# Patient Record
Sex: FEMALE | Race: WHITE
[De-identification: names, ages, dates, MRNs, and addresses within clinical notes are randomized per-mention and may not be internally consistent; named-entity substitution may affect disease eponyms.]

---

## 2017-03-26 ENCOUNTER — HOSPITAL ENCOUNTER (EMERGENCY)
Dept: HOSPITAL 42 - ED | Age: 42
Discharge: HOME | End: 2017-03-26
Payer: MEDICAID

## 2017-03-26 VITALS
DIASTOLIC BLOOD PRESSURE: 69 MMHG | RESPIRATION RATE: 17 BRPM | OXYGEN SATURATION: 96 % | TEMPERATURE: 97.7 F | SYSTOLIC BLOOD PRESSURE: 117 MMHG | HEART RATE: 82 BPM

## 2017-03-26 VITALS — BODY MASS INDEX: 26.6 KG/M2

## 2017-03-26 DIAGNOSIS — M25.561: Primary | ICD-10-CM

## 2017-03-26 DIAGNOSIS — I10: ICD-10-CM

## 2017-03-26 DIAGNOSIS — M06.9: ICD-10-CM

## 2017-03-26 PROCEDURE — 96372 THER/PROPH/DIAG INJ SC/IM: CPT

## 2017-03-26 PROCEDURE — 73560 X-RAY EXAM OF KNEE 1 OR 2: CPT

## 2017-03-26 PROCEDURE — 84703 CHORIONIC GONADOTROPIN ASSAY: CPT

## 2017-03-26 PROCEDURE — 99283 EMERGENCY DEPT VISIT LOW MDM: CPT

## 2017-03-26 PROCEDURE — 93970 EXTREMITY STUDY: CPT

## 2017-03-26 NOTE — ED PDOC
Arrival/HPI





<Misael Ivan - Last Filed: 03/26/17 06:31>





- General


Historian: Patient





- History of Present Illness


Time/Duration: Prior to Arrival


Symptom Course: Intermittent


Quality: Stabbing





<Romeo Ball - Last Filed: 03/26/17 06:35>





- General


Chief Complaint: Lower Extremity Problem/Injury


Time Seen by Provider: 03/26/17 04:32





- History of Present Illness


Narrative History of Present Illness (Text): 





03/26/17 04:55


42 y/o female with hx rheumatoid arthritis, diabetes, hypothyroidism presenting 

with complaints of right posterior knee pain. Patient states she has been 

experiencing intermittent pain for the last 10 days however her pain has 

significantly worsened tonight. Her pain typically presents at nighttime, often 

waking her from sleep. She denies any injury to the leg or knee joint. She 

denies any history of DVT, leg swelling or tenderness. No recent hx of 

immobility, OCP use or smoking. Pain is worse with flexion of right knee. 

Patient denies shortness of breath, chest pain, fever, chills.  (Romeo Ball)








Past Medical History





- Provider Review


Nursing Documentation Reviewed: Yes





- Infectious Disease


Hx of Infectious Diseases: None





- Tetanus Immunization


Tetanus Immunization: Unknown





- Cardiac


Hx Hypertension: Yes





- Pulmonary


Hx Respiratory Disorders: Yes


Hx Asthma: Yes





- Endocrine/Metabolic


Hx Diabetes Mellitus Type 1: Yes


Hx Hypothyroidism: Yes





- Psychiatric


Hx Depression: No


Hx Emotional Abuse: No


Hx Physical Abuse: No


Hx Substance Use: No





- Surgical History


Hx Cholecystectomy: Yes


Hx Hysterectomy: Yes


Hx Tubal Ligation: Yes (??)





- Suicidal Assessment


Feels Threatened In Home Enviroment: No





<Romeo Ball - Last Filed: 03/26/17 06:35>





Family/Social History





- Physician Review


Nursing Documentation Reviewed: Yes


Family/Social History: Unknown Family HX


Smoking Status: Never Smoked


Hx Alcohol Use: No


Hx Substance Use: No


Hx Substance Use Treatment: No





<Romeo Ball - Last Filed: 03/26/17 06:35>





Allergies/Home Meds





<Misael Ivan - Last Filed: 03/26/17 06:31>





<Romeo Ball - Last Filed: 03/26/17 06:35>


Allergies/Adverse Reactions: 


Allergies





No Known Allergies Allergy (Verified 02/05/16 23:00)


 


 per patient 








Home Medications: 


 Home Meds











 Medication  Instructions  Recorded  Confirmed


 


Glimepiride 2 mg PO BID 10/26/12 03/26/17


 


Metformin Hydrochloride [Metformin] 500 mg PO DAILY 09/26/13 03/26/17


 


Levothyroxine Sodium 75 mcg PO DAILY 02/23/15 03/26/17


 


Amlodipine Besylate/Benazepril 1 cap PO DAILY 03/26/17 03/26/17





[Amlodipine Besylate and   





Benazepril Hydrochlor]   














Review of Systems





- Physician Review


All systems were reviewed & negative as marked: Yes





- Review of Systems


Respiratory: absent: SOB, Cough


Cardiovascular: absent: Chest Pain


Musculoskeletal: Other (+ right posterior knee pain )





<Romeo Ball - Last Filed: 03/26/17 06:35>





Physical Exam


Vital Signs Reviewed: Yes


Temperature: Afebrile


Blood Pressure: Normal


Pulse: Regular


Respiratory Rate: Normal


Appearance: Positive for: Well-Appearing


Pain Distress: Mild


Mental Status: Positive for: Alert and Oriented X 3





- Systems Exam


Head: Present: Atraumatic, Normocephalic


Pupils: Present: PERRL


Extroacular Muscles: Present: EOMI


Conjunctiva: Present: Normal


Neck: Present: Normal Range of Motion


Respiratory/Chest: Present: Clear to Auscultation.  No: Respiratory Distress, 

Accessory Muscle Use


Cardiovascular: Present: Regular Rate and Rhythm, Normal S1, S2


Abdomen: Present: Normal Bowel Sounds.  No: Tenderness, Distention


Back: Present: Normal Inspection


Upper Extremity: Present: Normal Inspection.  No: Cyanosis, Edema


Lower Extremity: Present: Normal Inspection, NORMAL PULSES, Neurovascularly 

Intact.  No: Edema, CALF TENDERNESS, Cyanosis, Normal ROM (decreased knee 

flexion 2/2 pain ), Eugene's Sign, Tenderness, Swelling, Erythema


Neurological: Present: GCS=15, CN II-XII Intact, Speech Normal


Skin: Present: Warm, Dry, Normal Color.  No: Rashes


Psychiatric: Present: Alert, Oriented x 3, Normal Insight, Normal Concentration





<Romeo Ball - Last Filed: 03/26/17 06:35>


Vital Signs











  Temp Pulse Resp BP Pulse Ox


 


 03/26/17 04:41  97.7 F  82  17  117/69  96

















Medical Decision Making





<Misael Ivan - Last Filed: 03/26/17 06:31>





<Romeo Ball - Last Filed: 03/26/17 06:35>


ED Course and Treatment: 


Impression:


Pt seen and evaluated with medical resident. Pt, whose past medical history 

includes  rheumatoid arthritis, diabetes, and hypothyroidism, presented for 

intermittent posterior right knee pain for 10 days, worsening tonight. Aware 

and agree with HPI, clinical findings, plan, and management.





Plan:


-- XR Right Knee


-- US Duplex Lower Extremities


-- Reassess and disposition


 (Misael Ivan)





03/26/17 05:01


42 y/o female with hx rheumatoid arthritis, DM2, hypothyroidism presenting with 

right posterior knee pain. No injury or trauma reported. Patient with no 

significant risk factors for DVT. 


- knee xray 


- doppler b/l LE 


- reassess (Romeo Ball)








- Lab Interpretations


Lab Results: 


 Lab Results





03/26/17 05:02: Urine HCG, Qual Negative














- RAD Interpretation


Radiology Orders: 








03/26/17 04:53


DUPLEX LOWER EXTRM VEIN BILAT [US] Stat 





03/26/17 05:02


KNEE RIGHT 2 VIEWS (AP & LAT) [RAD] Stat 

















- PA / NP / Resident Statement


MD/ has reviewed & agrees with the documentation as recorded.


MD/ has examined the patient and agrees with the treatment plan.





<Misael Ivan - Last Filed: 03/26/17 06:31>





Disposition/Present on Arrival





<Misael Ivan - Last Filed: 03/26/17 06:31>





- Present on Arrival


Any Indicators Present on Arrival: No


History of DVT/PE: No


History of Uncontrolled Diabetes: No


Urinary Catheter: No


History of Decub. Ulcer: No


History Surgical Site Infection Following: None





- Disposition


Have Diagnosis and Disposition been Completed?: Yes


Disposition Time: 06:26


Patient Plan: Discharge





<Romeo Ball - Last Filed: 03/26/17 06:35>





- Disposition


Diagnosis: 


 Knee pain, chronic


Disposition: HOME/ ROUTINE


Patient Problems: 


 Current Active Problems











Problem Status Diagnosed


 


Knee pain, chronic Acute 











Condition: GOOD


Discharge Instructions (ExitCare):  Knee Pain (ED), Knee Exercises (GEN)


Additional Instructions: 


Please follow up with an Orthopedist for treatment of your knee pain. 


If pain continues to worsen or changes return to the emergency room. 


There is no evidence of bone fracture or blood clots to either leg. 


Referrals: 


Kaitlynn Caisllas MD [Primary Care Provider] - Follow up with primary


Marty Hay MD [Staff Provider] - Follow up with primary

## 2017-03-26 NOTE — US
HISTORY:

Leg pain and swelling. Evaluate for DVT



PHYSICIAN(S):  Gabino Bhatia MD.



TECHNIQUE:

Duplex sonography and color-flow Doppler with graded compression were 

used to evaluate the deep venous systems of both lower extremities. 



FINDINGS:

The visualized deep venous systems of both lower extremities are 

sonographically normal and compressible. Normal wave forms and 

augmentation are seen. There is no sonographic evidence for deep 

venous thrombosis in the visualized segments of both lower 

extremities.



IMPRESSION:

No sonographic evidence for deep venous thrombosis in the visualized 

segments of both lower extremities.

## 2017-03-26 NOTE — RAD
PROCEDURE:  Right Knee Radiographs.



HISTORY:

knee pain



COMPARISON:

None.



FINDINGS:



BONES:

Bone alignment and mineralization are normal.  There is no acute 

fracture or bone destruction. 



JOINTS:

There is moderate tricompartmental degenerative osteoarthrosis with 

reduced joint spaces, marginal osteophytes and tibial spiking, worse 

in the medial compartment. 



JOINT EFFUSION:

None. 



OTHER FINDINGS:

None.



IMPRESSION:

Moderate tricompartmental degenerative osteoarthrosis, worse in the 

medial compartment.

## 2017-08-17 ENCOUNTER — HOSPITAL ENCOUNTER (EMERGENCY)
Dept: HOSPITAL 42 - ED | Age: 42
Discharge: HOME | End: 2017-08-17
Payer: MEDICAID

## 2017-08-17 VITALS
RESPIRATION RATE: 18 BRPM | TEMPERATURE: 98.3 F | OXYGEN SATURATION: 97 % | SYSTOLIC BLOOD PRESSURE: 125 MMHG | HEART RATE: 89 BPM | DIASTOLIC BLOOD PRESSURE: 66 MMHG

## 2017-08-17 VITALS — BODY MASS INDEX: 26.6 KG/M2

## 2017-08-17 DIAGNOSIS — M25.571: Primary | ICD-10-CM

## 2017-08-17 NOTE — RAD
PROCEDURE:  Radiographs of the right tibia and fibula. 



HISTORY:

pain



COMPARISON:

None available.



TECHNIQUE:

Frontal and lateral views obtained. 



FINDINGS:



BONES:

No fracture or destructive lesion.



JOINT SPACES:

Unremarkable.



OTHER FINDINGS:

None.



IMPRESSION:

Unremarkable radiographs of the right tibia and fibula.

## 2017-08-17 NOTE — RAD
PROCEDURE:  Right Ankle Radiographs.



HISTORY:

pain  s/p trauma  



COMPARISON:

None



FINDINGS:



BONES:

Normal. No fracture. 



JOINTS:

Normal. No osteoarthritis. Ankle mortise maintained. Talar dome intact



SOFT TISSUES:

Normal. 



OTHER FINDINGS:

None.



IMPRESSION:

Normal right ankle radiographs.

## 2017-08-17 NOTE — ED PDOC
Arrival/HPI





- General


Chief Complaint: Lower Extremity Problem/Injury


Time Seen by Provider: 08/17/17 01:05


Historian: Patient





- History of Present Illness


Narrative History of Present Illness (Text): 





08/17/17 01:16


This 40 yo female presents to this ED c/o right ankle pain x 5 hours.  Patient 

stated she twisted her right ankle at a local gym.  Patient stated ankle pain 

was mild, so she told gym staff that she did not need to got to ED.  Patient 

went home, the pain has worsen few hours later.  Patient is pointing at medial 

malleoulus area as the most painful side.  Denies posterior ankle pain, knee 

pain, calf pain, weakness, paresthesias, or hip pain.  Denies other complains.


Time/Duration: 4-6 hours


Symptom Onset: Sudden


Symptom Course: Worsening


Quality: Aching


Context: Other (gym)





Past Medical History





- Provider Review


Nursing Documentation Reviewed: Yes





- Infectious Disease


Hx of Infectious Diseases: None





- Tetanus Immunization


Tetanus Immunization: Unknown





- Cardiac


Hx Hypertension: Yes





- Pulmonary


Hx Respiratory Disorders: Yes


Hx Asthma: Yes





- Neurological


Hx Neurological Disorder: No





- HEENT


Hx HEENT Disorder: No





- Renal


Hx Kidney Stones: No





- Endocrine/Metabolic


Hx Diabetes Mellitus Type 1: Yes


Hx Hypothyroidism: Yes





- Hematological/Oncological


Hx Blood Disorders: No





- Integumentary


Hx Dermatological Disorder: No





- Musculoskeletal/Rheumatological


Hx Musculoskeletal Disorders: No





- Gastrointestinal


Hx Gastrointestinal Disorders: No





- Genitourinary/Gynecological


Hx Genitourinary Disorders: No





- Psychiatric


Hx Psychophysiologic Disorder: No


Hx Depression: No


Hx Emotional Abuse: No


Hx Physical Abuse: No


Hx Substance Use: No





- Surgical History


Hx Cholecystectomy: Yes


Hx Hysterectomy: Yes


Hx Tubal Ligation: Yes (??)





- Suicidal Assessment


Feels Threatened In Home Enviroment: No





Family/Social History





- Physician Review


Nursing Documentation Reviewed: Yes


Family/Social History: Other (non-contributory)


Smoking Status: Never Smoked


Hx Alcohol Use: No


Hx Substance Use: No


Hx Substance Use Treatment: No





Allergies/Home Meds


Allergies/Adverse Reactions: 


Allergies





No Known Allergies Allergy (Verified 08/17/17 01:01)


 


 per patient 








Home Medications: 


 Home Meds











 Medication  Instructions  Recorded  Confirmed


 


Glimepiride 2 mg PO BID 10/26/12 03/26/17


 


Metformin Hydrochloride [Metformin] 500 mg PO DAILY 09/26/13 03/26/17


 


Levothyroxine Sodium 75 mcg PO DAILY 02/23/15 03/26/17


 


Amlodipine Besylate/Benazepril 1 cap PO DAILY 03/26/17 03/26/17





[Amlodipine Besylate and   





Benazepril Hydrochlor]   














Review of Systems





- Review of Systems


Constitutional: Normal.  absent: Fatigue, Weight Change, Fevers


Eyes: Normal


ENT: Normal


Respiratory: Normal.  absent: SOB, Cough


Cardiovascular: Normal.  absent: Chest Pain, Other


Gastrointestinal: Normal.  absent: Abdominal Pain, Nausea, Vomiting


Genitourinary Female: Normal


Musculoskeletal: Other (see hpi)


Skin: Normal


Neurological: Normal.  absent: Headache, Dizziness, Focal Weakness


Endocrine: Normal


Hemo/Lymphatic: Normal


Psychiatric: Normal





Physical Exam


Vital Signs











  Temp Pulse Resp BP Pulse Ox


 


 08/17/17 01:02  98.3 F  89  18  125/66  97











Temperature: Afebrile


Blood Pressure: Normal


Pulse: Regular


Respiratory Rate: Normal


Appearance: Positive for: Well-Appearing, Non-Toxic, Comfortable


Pain Distress: None


Mental Status: Positive for: Alert and Oriented X 3





- Systems Exam


Head: Present: Atraumatic, Normocephalic


Pupils: Present: PERRL


Extroacular Muscles: Present: EOMI


Conjunctiva: Present: Normal


Mouth: Present: Moist Mucous Membranes


Upper Extremity: Present: Normal Inspection, Normal ROM


Lower Extremity: Present: NORMAL PULSES, Tenderness, Swelling, Neurovascularly 

Intact, Capillary Refill < 2 s, Other ((+) mild swelling and tender on right 

lateral malleoulus area.  Edmondson test was negative.  No calf tenderness.   No 

posterior ankle tenderness).  No: Edema, CALF TENDERNESS, Normal ROM, Eugene's 

Sign, Erythema, Deformity, Temperature Abnormalties


Neurological: Present: GCS=15, CN II-XII Intact, Speech Normal, Motor Func 

Grossly Intact, Normal Sensory Function, Normal Cerebellar Funct, Memory Normal


Skin: Present: Warm, Dry, Normal Color.  No: Rashes


Psychiatric: Present: Alert, Oriented x 3





Medical Decision Making


ED Course and Treatment: 





08/17/17 02:06


Re-evaluation.  Patient feels better.  Discussed results and plan with patient 

who expresses understanding.  All questions answered and there is agreement 

with the plan to discharge home with instructions.  Patient stable for 

discharge.  Return if symptoms persist or worsen


Patient was recommended to keep ankle elevated, ice, ace bandage, air cast, 

crutches for at least 5 days.  To f/u podiatrist if pain persist.  To remove 

ace bandage and air cast at bedtime.  Return to emergency if symptoms worsen.


Re-evaluation Time: 02:06


Reassessment Condition: Re-examined, Improved





- RAD Interpretation


Narrative RAD Interpretations (Text): 





08/17/17 02:06


Ankle x-rays:  No fx





Tib Fib x-rays:  no fx











Radiology Orders: 








08/17/17 01:15


ANKLE RIGHT 3 VIEWS ROUTINE [RAD] Stat 


TIBIA FIBULA RIGHT [RAD] Stat 














- Medication Orders


Current Medication Orders: 











Discontinued Medications





Acetaminophen (Tylenol 325mg Tab)  650 mg PO STAT STA


   Stop: 08/17/17 01:17


   Last Admin: 08/17/17 01:31  Dose: 650 mg





Acetaminophen (Tylenol 325mg Tab) Confirm Administered Dose 650 mg .ROUTE .STK-

MED ONE


   Stop: 08/17/17 01:28











Disposition/Present on Arrival





- Present on Arrival


Any Indicators Present on Arrival: No


History of DVT/PE: No


History of Uncontrolled Diabetes: No


Urinary Catheter: No


History of Decub. Ulcer: No


History Surgical Site Infection Following: None





- Disposition


Have Diagnosis and Disposition been Completed?: Yes


Diagnosis: 


 Ankle pain





Disposition: HOME/ ROUTINE


Disposition Time: 02:10


Patient Plan: Discharge


Condition: GOOD


Discharge Instructions (ExitCare):  Ankle Sprain (ED), Crutch Instructions (ED)


Additional Instructions: 


Call private doctor for follow up visit in 1-2 days.   Keep ankle elevated, ice

, rest, crutches, air cast.  Remove air cast at bedtime.  Return to emergency 

if symptoms worsen.  Call podiatrist if pain persist or worsen


Prescriptions: 


Acetaminophen with Codeine [Tylenol with Codeine #3 Tablet] 1 each PO Q6H PRN #

10 tablet


 PRN Reason: Pain, Severe (8-10)


Referrals: 


Kaitlynn Casillas MD [Primary Care Provider] - Follow up with primary


Ambika Garcia DPM [Staff Provider] - Follow up with primary


Forms:  [employer] [facility] (English), WORK NOTE

## 2018-01-27 ENCOUNTER — HOSPITAL ENCOUNTER (EMERGENCY)
Dept: HOSPITAL 42 - ED | Age: 43
Discharge: HOME | End: 2018-01-27
Payer: MEDICAID

## 2018-01-27 VITALS — BODY MASS INDEX: 26.6 KG/M2

## 2018-01-27 VITALS — TEMPERATURE: 98.1 F | OXYGEN SATURATION: 99 %

## 2018-01-27 DIAGNOSIS — I10: ICD-10-CM

## 2018-01-27 DIAGNOSIS — M77.8: Primary | ICD-10-CM

## 2018-01-27 DIAGNOSIS — M13.832: ICD-10-CM

## 2018-01-27 LAB
ALBUMIN SERPL-MCNC: 3.9 G/DL (ref 3–4.8)
ALBUMIN/GLOB SERPL: 1.2 {RATIO} (ref 1.1–1.8)
ALT SERPL-CCNC: 29 U/L (ref 7–56)
AST SERPL-CCNC: 16 U/L (ref 14–36)
BASOPHILS # BLD AUTO: 0.01 K/MM3 (ref 0–2)
BASOPHILS NFR BLD: 0.2 % (ref 0–3)
BUN SERPL-MCNC: 21 MG/DL (ref 7–21)
CALCIUM SERPL-MCNC: 8.9 MG/DL (ref 8.4–10.5)
EOSINOPHIL # BLD: 0.1 10*3/UL (ref 0–0.7)
EOSINOPHIL NFR BLD: 2 % (ref 1.5–5)
ERYTHROCYTE [DISTWIDTH] IN BLOOD BY AUTOMATED COUNT: 14 % (ref 11.5–14.5)
GFR NON-AFRICAN AMERICAN: > 60
GRANULOCYTES # BLD: 2.56 10*3/UL (ref 1.4–6.5)
GRANULOCYTES NFR BLD: 41.8 % (ref 50–68)
HGB BLD-MCNC: 10.4 G/DL (ref 12–16)
LYMPHOCYTES # BLD: 3 10*3/UL (ref 1.2–3.4)
LYMPHOCYTES NFR BLD AUTO: 49.6 % (ref 22–35)
MCH RBC QN AUTO: 22.8 PG (ref 25–35)
MCHC RBC AUTO-ENTMCNC: 31.9 G/DL (ref 31–37)
MCV RBC AUTO: 71.5 FL (ref 80–105)
MONOCYTES # BLD AUTO: 0.4 10*3/UL (ref 0.1–0.6)
MONOCYTES NFR BLD: 6.4 % (ref 1–6)
PLATELET # BLD: 280 10^3/UL (ref 120–450)
PMV BLD AUTO: 10 FL (ref 7–11)
RBC # BLD AUTO: 4.56 10^6/UL (ref 3.5–6.1)
URATE SERPL-MCNC: 3.3 MG/DL (ref 2.5–6.2)
WBC # BLD AUTO: 6.1 10^3/UL (ref 4.5–11)

## 2018-01-27 PROCEDURE — 80053 COMPREHEN METABOLIC PANEL: CPT

## 2018-01-27 PROCEDURE — 84550 ASSAY OF BLOOD/URIC ACID: CPT

## 2018-01-27 PROCEDURE — 73110 X-RAY EXAM OF WRIST: CPT

## 2018-01-27 PROCEDURE — 93971 EXTREMITY STUDY: CPT

## 2018-01-27 PROCEDURE — 85025 COMPLETE CBC W/AUTO DIFF WBC: CPT

## 2018-01-27 PROCEDURE — 99284 EMERGENCY DEPT VISIT MOD MDM: CPT

## 2018-01-27 PROCEDURE — 96374 THER/PROPH/DIAG INJ IV PUSH: CPT

## 2018-01-27 PROCEDURE — 96375 TX/PRO/DX INJ NEW DRUG ADDON: CPT

## 2018-01-27 NOTE — ED PDOC
Arrival/HPI





- General


Chief Complaint: Finger,Hand,&Wrist


Time Seen by Provider: 01/27/18 21:40


Historian: Patient





- History of Present Illness


Narrative History of Present Illness (Text): 





01/27/18 21:40


41 y/o female, pmh including htn/dm/hypothyroidism/rheumatoid arthiritis, nkda, 

c/o Lt. wrist pain started yesterday with no fall or trauma.  Aching pain, 

painful to move, no pain medication taken at home, feels like this is the 

flared up of her rheumatoid arthiritis, no numbness or tingling, no palpitation

, no rash, no night sweat, no other medical or psychological complaints. 





Past Medical History





- Provider Review


Nursing Documentation Reviewed: Yes





- Infectious Disease


Hx of Infectious Diseases: None





- Tetanus Immunization


Tetanus Immunization: Unknown





- Cardiac


Hx Hypertension: Yes





- Pulmonary


Hx Respiratory Disorders: Yes


Hx Asthma: Yes





- Neurological


Hx Neurological Disorder: No





- HEENT


Hx HEENT Disorder: No





- Renal


Hx Kidney Stones: No





- Endocrine/Metabolic


Hx Diabetes Mellitus Type 1: Yes


Hx Hypothyroidism: Yes





- Hematological/Oncological


Hx Blood Disorders: No





- Integumentary


Hx Dermatological Disorder: No





- Musculoskeletal/Rheumatological


Hx Musculoskeletal Disorders: No





- Gastrointestinal


Hx Gastrointestinal Disorders: No





- Genitourinary/Gynecological


Hx Genitourinary Disorders: No





- Psychiatric


Hx Psychophysiologic Disorder: No


Hx Depression: No


Hx Emotional Abuse: No


Hx Physical Abuse: No


Hx Substance Use: No





- Surgical History


Hx Cholecystectomy: Yes


Hx Hysterectomy: Yes


Hx Tubal Ligation: Yes (??)





- Suicidal Assessment


Feels Threatened In Home Enviroment: No





Family/Social History





- Physician Review


Nursing Documentation Reviewed: Yes


Family/Social History: Unknown Family HX


Smoking Status: Never Smoked


Hx Alcohol Use: No


Hx Substance Use: No


Hx Substance Use Treatment: No





Allergies/Home Meds


Allergies/Adverse Reactions: 


Allergies





No Known Allergies Allergy (Verified 08/17/17 01:01)


 


 per patient 








Home Medications: 


 Home Meds











 Medication  Instructions  Recorded  Confirmed


 


Glimepiride 2 mg PO BID 10/26/12 03/26/17


 


Metformin Hydrochloride [Metformin] 500 mg PO DAILY 09/26/13 03/26/17


 


Levothyroxine Sodium 75 mcg PO DAILY 02/23/15 03/26/17


 


Amlodipine Besylate/Benazepril 1 cap PO DAILY 03/26/17 03/26/17





[Amlodipine Besylate and   





Benazepril Hydrochlor]   














Review of Systems





- Review of Systems


Constitutional: absent: Fatigue, Fevers


Eyes: absent: Vision Changes


ENT: absent: Hearing Changes


Respiratory: absent: SOB, Cough


Cardiovascular: absent: Chest Pain, Syncope


Gastrointestinal: absent: Abdominal Pain, Nausea, Vomiting


Musculoskeletal: Arthralgias, Joint Swelling.  absent: Back Pain, Neck Pain, 

Myalgias


Skin: absent: Rash, Pruritis


Psychiatric: absent: Anxiety, Depression





Physical Exam


Vital Signs Reviewed: Yes


Vital Signs











  Temp Pulse Resp BP Pulse Ox


 


 01/27/18 23:12   78  16  128/78  99


 


 01/27/18 21:34  98.1 F  83  15  132/85  99











Temperature: Afebrile


Blood Pressure: Normal


Pulse: Regular


Respiratory Rate: Normal


Appearance: Positive for: Well-Appearing, Non-Toxic


Pain Distress: Moderate


Mental Status: Positive for: Alert and Oriented X 3





- Systems Exam


Head: Present: Atraumatic, Normocephalic


Pupils: Present: PERRL


Extroacular Muscles: Present: EOMI


Conjunctiva: Present: Normal


Mouth: Present: Moist Mucous Membranes


Neck: Present: Normal Range of Motion


Respiratory/Chest: Present: Clear to Auscultation, Good Air Exchange.  No: 

Respiratory Distress, Accessory Muscle Use


Cardiovascular: Present: Regular Rate and Rhythm, Normal S1, S2.  No: Murmurs


Abdomen: Present: Normal Bowel Sounds.  No: Tenderness, Distention, Peritoneal 

Signs


Back: Present: Normal Inspection


Upper Extremity: Present: Normal Inspection, Normal ROM, Neurovascularly Intact

, Capillary Refill < 2s, Norm 2-Pt Discrimination, Other (Lt. wrist: +ttp on 

lt. wrist, no scaphoid tenderness, no erythematous, no cellulitis or streaking, 

no ulcers, FROM without limitation, sensation intact, motor 5/5, +radial pulse, 

capillary refill< 2 seconds, neurovascular intact. ).  No: Cyanosis, Edema, 

Erythema, Deformity


Lower Extremity: Present: Normal Inspection.  No: Edema


Neurological: Present: GCS=15, CN II-XII Intact, Speech Normal


Skin: Present: Warm, Dry, Normal Color.  No: Rashes


Psychiatric: Present: Alert, Oriented x 3, Normal Insight, Normal Concentration





Medical Decision Making


ED Course and Treatment: 





01/27/18 22:09


-labs/uric acid


-Lt. wrist xray


-LUE venuous doppler


-IV toradol/decadron





01/27/18 23:38


-Urine hcg negative


-LUE venuous doppler: as per preliminary report, no acute DVT


-Lt. wrist xray: no acute fracture or dislocation


-Labs are non-significant


-Pt. feels much better, will discharge home.


-Discharge home with indomethacin, ace wrap, follow up with your own pmd and 

orthopedic within 2 days, return to the ER for any new or worsening signs or 

symptoms. 





- Lab Interpretations


Lab Results: 








 01/27/18 22:25 





 01/27/18 22:25 





 Lab Results





01/27/18 22:25: Sodium 135, Potassium 3.6, Chloride 100, Carbon Dioxide 24, 

Anion Gap 15, BUN 21, Creatinine 0.6 L, Est GFR ( Amer) > 60, Est GFR (

Non-Af Amer) > 60, Random Glucose 268 H, Uric Acid 3.3, Calcium 8.9, Total 

Bilirubin 0.2, AST 16, ALT 29, Alkaline Phosphatase 52, Total Protein 7.1, 

Albumin 3.9, Globulin 3.2, Albumin/Globulin Ratio 1.2


01/27/18 22:25: WBC 6.1, RBC 4.56, Hgb 10.4 L, Hct 32.6 L, MCV 71.5 L, MCH 22.8 

L, MCHC 31.9, RDW 14.0, Plt Count 280, MPV 10.0, Gran % 41.8 L, Lymph % (Auto) 

49.6 H, Mono % (Auto) 6.4 H, Eos % (Auto) 2.0, Baso % (Auto) 0.2, Gran # 2.56, 

Lymph # 3.0, Mono # 0.4, Eos # 0.1, Baso # 0.01











- RAD Interpretation


Radiology Orders: 








01/27/18 22:00


WRIST, LEFT 3 VIEWS [RAD] Stat 


DUPLEX UPPER EXTRM VEIN LEFT [US] Stat 











LUE venuous doppler: as per preliminary report, no acute DVT


---------------------------------------------------------------


Lt. wrist xray: no acute fracture or dislocation


: Radiologist





- Medication Orders


Current Medication Orders: 











Discontinued Medications





Dexamethasone (Decadron Inj)  8 mg IVP STAT STA


   Stop: 01/27/18 22:01


   Last Admin: 01/27/18 22:29  Dose: 8 mg





IVP Administration


 Document     01/27/18 22:29  JOL  (Rec: 01/27/18 22:29  [id number])


     Charges for Administration


      # of IVP Administrations                   1





Ketorolac Tromethamine (Toradol)  30 mg IVP STAT STA


   Stop: 01/27/18 22:01


   Last Admin: 01/27/18 22:29  Dose: 30 mg





MAR Pain Assessment


 Document     01/27/18 22:29  JOL  (Rec: 01/27/18 22:29  [id number])


     Pain Reassessment


      Is this a pain reassessment?               No


     Sleep


      Is patient sleeping during reassessment?   No


     Presence of Pain


      Presence of Pain                           Yes


     Pain Scale Used


      Pain Scale Used                            Numeric


     Location


      Left, Right or Bilateral                   Left


      Pain Location Body Site                    Wrist


IVP Administration


 Document     01/27/18 22:29  JOL  (Rec: 01/27/18 22:29  [place] [id number])


     Charges for Administration


      # of IVP Administrations                   1














- PA / NP / Resident Statement


MD/DO has reviewed & agrees with the documentation as recorded.





Disposition/Present on Arrival





- Present on Arrival


Any Indicators Present on Arrival: No


History of DVT/PE: No


History of Uncontrolled Diabetes: No


Urinary Catheter: No


History of Decub. Ulcer: No


History Surgical Site Infection Following: None





- Disposition


Have Diagnosis and Disposition been Completed?: Yes


Diagnosis: 


 Arthritis, Tendinitis





Disposition: HOME/ ROUTINE


Disposition Time: 22:09


Patient Plan: Discharge


Condition: IMPROVED


Additional Instructions: 


-Discharge home with indomethacin, ace wrap, follow up with your own pmd and 

orthopedic within 2 days, return to the ER for any new or worsening signs or 

symptoms. 





Prescriptions: 


Indomethacin [Indocin] 50 mg PO TID PRN #30 cap


 PRN Reason: Other


Referrals: 


PCP,NO [Primary Care Provider] - Follow up with primary


Marty Hay MD [Staff Provider] - Follow up with primary


[facility] [place] [Outside] - Follow up with primary


Forms:  WORK NOTE

## 2018-01-28 VITALS — RESPIRATION RATE: 16 BRPM | DIASTOLIC BLOOD PRESSURE: 78 MMHG | HEART RATE: 78 BPM | SYSTOLIC BLOOD PRESSURE: 128 MMHG

## 2018-01-28 NOTE — RAD
PROCEDURE:  Left Wrist Radiographs.







HISTORY:

Left wrist pain x 2 days



COMPARISON:

None.



FINDINGS:



BONES:

Bone alignment and mineralization are normal.  There is no acute 

displaced fracture or bone destruction. 



JOINTS:

The proximal and distal carpal rows are maintained. No dislocation. 



SOFT TISSUES:

Normal. 



OTHER FINDINGS:

None.



IMPRESSION:

No acute fracture or dislocation.

## 2018-01-29 NOTE — US
PROCEDURE:  Left upper extremity venous ultrasound



HISTORY:

Arm pain and swelling. Evaluate for deep venous thrombosis.



PHYSICIAN(S):  Gabino Bhatia MD.



FINDINGS:

The visualized leftinternal jugular vein is sonographically normal 

and compressible. No evidence of obstruction or thrombus is seen. 



The visualized segments of the left subclavian vein are patent with 

normal waveforms. No sonographic evidence of obstruction or 

thrombosis is seen. 



The visualized deep venous system of the proximal leftupper extremity 

is sonographically normal and compressible.



IMPRESSION:

1. No sonographic evidence for deep venous thrombosis in the 

visualized segments of the left upper extremity.

## 2018-04-18 ENCOUNTER — HOSPITAL ENCOUNTER (EMERGENCY)
Dept: HOSPITAL 42 - ED | Age: 43
Discharge: HOME | End: 2018-04-18
Payer: MEDICAID

## 2018-04-18 VITALS
HEART RATE: 78 BPM | OXYGEN SATURATION: 99 % | DIASTOLIC BLOOD PRESSURE: 80 MMHG | SYSTOLIC BLOOD PRESSURE: 124 MMHG | RESPIRATION RATE: 17 BRPM

## 2018-04-18 VITALS — TEMPERATURE: 98.6 F

## 2018-04-18 VITALS — BODY MASS INDEX: 26.6 KG/M2

## 2018-04-18 DIAGNOSIS — R10.9: ICD-10-CM

## 2018-04-18 DIAGNOSIS — K59.00: Primary | ICD-10-CM

## 2018-04-18 DIAGNOSIS — I10: ICD-10-CM

## 2018-04-18 DIAGNOSIS — R91.1: ICD-10-CM

## 2018-04-18 DIAGNOSIS — E03.9: ICD-10-CM

## 2018-04-18 DIAGNOSIS — E11.9: ICD-10-CM

## 2018-04-18 LAB
APPEARANCE UR: CLEAR
BACTERIA #/AREA URNS HPF: (no result) /[HPF]
BILIRUB UR-MCNC: NEGATIVE MG/DL
COLOR UR: YELLOW
GLUCOSE UR STRIP-MCNC: 500 MG/DL
HCG,QUALITATIVE URINE: NEGATIVE
LEUKOCYTE ESTERASE UR-ACNC: (no result) LEU/UL
PH UR STRIP: 6 [PH] (ref 4.7–8)
PROT UR STRIP-MCNC: NEGATIVE MG/DL
RBC # UR STRIP: NEGATIVE /UL
RBC #/AREA URNS HPF: (no result) /HPF (ref 0–2)
SP GR UR STRIP: 1.02 (ref 1–1.03)
UROBILINOGEN UR STRIP-ACNC: 0.2 E.U./DL
WBC #/AREA URNS HPF: (no result) /HPF (ref 0–6)

## 2018-04-18 NOTE — ED PDOC
Arrival/HPI





- General


Chief Complaint: Abnormal Skin Integrity


Time Seen by Provider: 04/18/18 14:29


Historian: Patient





- History of Present Illness


Narrative History of Present Illness (Text): 





04/18/18 14:43


This 41 yo female with pmh Diabetes, hypothyroidism, Hypertension, presents to 

this emergency department complaining of left flank pain since last night.  

Patient stated pain is sharp, and intermittent, radiating to her left groin.  

Patient denies urinary symptoms, fever, vaginal discharge, n/v/d, dizziness, or 

abnormal gait.


Patient noted she was [place] [facility] [place].  Patient stated 

bruises on her right arm after IV insertion.  Denies pus or cellulitis.


Time/Duration: Other (see hpi)


Quality: Other (sharp)


Context: Home





Past Medical History





- Provider Review


Nursing Documentation Reviewed: Yes





- Infectious Disease


Hx of Infectious Diseases: None





- Tetanus Immunization


Tetanus Immunization: Unknown





- Reproductive


Menopause: No





- Cardiac


Hx Hypertension: Yes





- Pulmonary


Hx Respiratory Disorders: Yes


Hx Asthma: Yes





- Neurological


Hx Neurological Disorder: No





- HEENT


Hx HEENT Disorder: No





- Renal


Hx Kidney Stones: No





- Endocrine/Metabolic


Hx Diabetes Mellitus Type 1: Yes


Hx Hypothyroidism: Yes





- Hematological/Oncological


Hx Blood Disorders: No





- Integumentary


Hx Dermatological Disorder: No





- Musculoskeletal/Rheumatological


Hx Musculoskeletal Disorders: No





- Gastrointestinal


Hx Gastrointestinal Disorders: No





- Genitourinary/Gynecological


Hx Genitourinary Disorders: No





- Psychiatric


Hx Psychophysiologic Disorder: No


Hx Depression: No


Hx Emotional Abuse: No


Hx Physical Abuse: No


Hx Substance Use: No





- Surgical History


Hx Cholecystectomy: Yes


Hx Hysterectomy: Yes


Hx Tubal Ligation: Yes (??)





- Suicidal Assessment


Feels Threatened In Home Enviroment: No





Family/Social History





- Physician Review


Nursing Documentation Reviewed: Yes


Family/Social History: Other (noncontributory)


Smoking Status: Never Smoked


Hx Alcohol Use: No


Hx Substance Use: No


Hx Substance Use Treatment: No





Allergies/Home Meds


Allergies/Adverse Reactions: 


Allergies





No Known Allergies Allergy (Verified 08/17/17 01:01)


 


 per patient 








Home Medications: 


 Home Meds











 Medication  Instructions  Recorded  Confirmed


 


Glimepiride 2 mg PO BID 10/26/12 03/26/17


 


Metformin Hydrochloride [Metformin] 500 mg PO DAILY 09/26/13 03/26/17


 


Levothyroxine Sodium 75 mcg PO DAILY 02/23/15 03/26/17


 


Amlodipine Besylate/Benazepril 1 cap PO DAILY 03/26/17 03/26/17





[Amlodipine Besylate and   





Benazepril Hydrochlor]   














Review of Systems





- Review of Systems


Constitutional: Normal.  absent: Fatigue, Weight Change, Fevers


Eyes: Normal


ENT: Normal


Respiratory: Normal.  absent: SOB, Cough


Cardiovascular: Normal.  absent: Chest Pain, Palpitations


Gastrointestinal: Other (left flank pain).  absent: Nausea, Vomiting


Genitourinary Female: Normal.  absent: Dysuria, Frequency, Hematuria, Vaginal 

Bleeding, Vaginal Discharge


Musculoskeletal: Normal.  absent: Back Pain


Skin: Other (right arm brusing)


Neurological: Normal.  absent: Headache, Dizziness, Focal Weakness, Gait Changes

, Speech Changes, Facial Droop, Disequilibrium, Seizure


Endocrine: Normal


Hemo/Lymphatic: Normal


Psychiatric: Normal





Physical Exam


Vital Signs











  Temp Pulse Resp BP Pulse Ox


 


 04/18/18 15:26   89  18  121/75  98


 


 04/18/18 13:50  98.6 F  94 H  20  119/80  99











Temperature: Afebrile


Blood Pressure: Normal


Pulse: Regular


Respiratory Rate: Normal


Appearance: Positive for: Well-Appearing, Non-Toxic, Comfortable


Pain Distress: None


Mental Status: Positive for: Alert and Oriented X 3





- Systems Exam


Head: Present: Atraumatic, Normocephalic


Pupils: Present: PERRL


Extroacular Muscles: Present: EOMI


Conjunctiva: Present: Normal


Mouth: Present: Moist Mucous Membranes


Neck: Present: Normal Range of Motion


Respiratory/Chest: Present: Clear to Auscultation, Good Air Exchange.  No: 

Respiratory Distress, Accessory Muscle Use


Cardiovascular: Present: Regular Rate and Rhythm, Normal S1, S2.  No: Murmurs


Abdomen: No: Tenderness, Distention, Peritoneal Signs, Rebound, Guarding


Back: Present: Normal Inspection.  No: CVA Tenderness, Midline Tenderness, 

Paraspinal Tenderness, Pain with Leg Raise


Upper Extremity: Present: Normal ROM, NORMAL PULSES, Neurovascularly Intact, 

Capillary Refill < 2s, Other ((+) 4 cm healing ecchymosis on right antecubital 

area from IV insertion.  No cellulitis or abscess.  No streaking erythema.  No 

compartment syndrome.).  No: Cyanosis, Edema, Tenderness, Swelling, Erythema


Lower Extremity: Present: Normal Inspection, Normal ROM.  No: Edema


Neurological: Present: GCS=15, CN II-XII Intact, Speech Normal, Motor Func 

Grossly Intact, Normal Sensory Function, Normal Cerebellar Funct, Gait Normal


Skin: Present: Warm, Dry, Normal Color.  No: Rashes


Psychiatric: Present: Alert, Oriented x 3, Normal Insight, Normal Concentration





Medical Decision Making


ED Course and Treatment: 





04/18/18 14:47


-labs


-ct abdom./pelvis


-ua/hcg





04/18/18  15:05


Patient refused blood test.  She stated she had blood test done at [facility] [place] 

[place] ago.


04/18/18 16:50


Re-evaluation.  Patient feels better.  Discussed results and plan with patient 

who expresses understanding.  All questions answered and there is agreement 

with the plan to discharge home with instructions.  Patient stable for 

discharge.  Return if symptoms persist or worsen.








Re-evaluation Time: 16:50


Reassessment Condition: Re-examined, Improved





- Lab Interpretations


Lab Results: 


 Lab Results





04/18/18 15:18: Urine Color Yellow, Urine Appearance Clear, Urine pH 6.0, Ur 

Specific Gravity 1.025, Urine Protein Negative, Urine Glucose (UA) 500 H, Urine 

Ketones Negative, Urine Blood Negative, Urine Nitrate Negative, Urine Bilirubin 

Negative, Urine Urobilinogen 0.2, Ur Leukocyte Esterase Trace H, Urine RBC 0 - 2

, Urine WBC 0 - 2, Ur Epithelial Cells 4 - 5, Urine Bacteria Few, Urine HCG, 

Qual Negative








I have reviewed the lab results: Yes


Interpretation: No clinic. lab abnormalty





- RAD Interpretation


Narrative RAD Interpretations (Text): 





04/18/18 16:50





PROCEDURE:  CT Abdomen and Pelvis without intravenous contrast





HISTORY:


left flank pain





COMPARISON:


02/06/2016 CT abdomen and pelvis





TECHNIQUE:


Unenhanced study. Neither oral nor intravenous contrast administered. 





Total exam DLP = 816.28 mGy-cm.





This CT exam was performed using one or more of the following dose reduction 

techniques: Automated exposure control, adjustment of the mA and/or kV 

according to patient size, and/or use of iterative reconstruction technique.





FINDINGS:





LOWER THORAX:


Peripheral pleural-based 5 mm pulmonary nodule right middle lobe. Stable 

finding compared to prior studies.





Peripheral 6 mm pulmonary nodule lateral segment left lower lobe stable 

findings 





LIVER:


Unremarkable. No gross lesion or ductal dilatation.  





GALLBLADDER AND BILE DUCTS:


Status post cholecystectomy. No abnormality is seen in the gallbladder fossa.  





PANCREAS:


Unremarkable. No gross lesion or ductal dilatation.





SPLEEN:


Unremarkable. 





ADRENALS:


Unremarkable. No mass. 





KIDNEYS AND URETERS:


Unremarkable. No hydronephrosis. No solid mass. 





VASCULATURE:


Unremarkable. No aortic aneurysm. 





BOWEL:


Constipation without fecal impaction or obstruction.  





APPENDIX:


No abnormalities to suggest acute appendicitis. No right lower quadrant 

inflammatory processes identified. 





PERITONEUM:


Unremarkable. No free fluid. No free air. 





LYMPH NODES:


Unremarkable. No enlarged lymph nodes. 





BLADDER:


Unremarkable. 





REPRODUCTIVE:


Unremarkable. 





BONES:


No acute fracture. 





OTHER FINDINGS:


None.





IMPRESSION:


No significant or acute  findings to account for/ related to the clinical 

presentation.





Additional benign and/or incidental findings described above.





No significant interval change compared to the prior examination(s).











Radiology Orders: 








04/18/18 14:43


ABD & PELVIS W/O PO OR IV CONT [CT] Stat 














Disposition/Present on Arrival





- Present on Arrival


Any Indicators Present on Arrival: No


History of DVT/PE: No


History of Uncontrolled Diabetes: No


Urinary Catheter: No


History of Decub. Ulcer: No


History Surgical Site Infection Following: None





- Disposition


Have Diagnosis and Disposition been Completed?: Yes


Diagnosis: 


 Constipation, Nonspecific abdominal pain, Lung nodule





Disposition: HOME/ ROUTINE


Disposition Time: 16:53


Patient Plan: Discharge


Condition: GOOD


Additional Instructions: 


Call private doctor for follow up visit in 1-2 days.  Take medication as 

instructed.  Make sure to review urine culture in 2-3 days with your doctor.  

Take miralx daily to prevent constipation.  Return to emergency if symptoms 

worsen.


Prescriptions: 


Cephalexin [Keflex] 500 mg PO BID #14 capsule


Polyethylene Glycol 3350 [Miralax] 17 gm PO DAILY #1 packet


Referrals: 


Kaitlynn Casillas MD [Primary Care Provider] - Follow up with primary


Forms:  [employer] (English), WORK NOTE

## 2018-04-18 NOTE — CT
PROCEDURE:  CT Abdomen and Pelvis without intravenous contrast



HISTORY:

left flank pain



COMPARISON:

02/06/2016 CT abdomen and pelvis



TECHNIQUE:

Unenhanced study. Neither oral nor intravenous contrast administered. 



Total exam DLP = 816.28 mGy-cm.



This CT exam was performed using one or more of the following dose 

reduction techniques: Automated exposure control, adjustment of the 

mA and/or kV according to patient size, and/or use of iterative 

reconstruction technique.



FINDINGS:



LOWER THORAX:

Peripheral pleural-based 5 mm pulmonary nodule right middle lobe. 

Stable finding compared to prior studies.



Peripheral 6 mm pulmonary nodule lateral segment left lower lobe 

stable findings 



LIVER:

Unremarkable. No gross lesion or ductal dilatation.  



GALLBLADDER AND BILE DUCTS:

Status post cholecystectomy. No abnormality is seen in the 

gallbladder fossa.  



PANCREAS:

Unremarkable. No gross lesion or ductal dilatation.



SPLEEN:

Unremarkable. 



ADRENALS:

Unremarkable. No mass. 



KIDNEYS AND URETERS:

Unremarkable. No hydronephrosis. No solid mass. 



VASCULATURE:

Unremarkable. No aortic aneurysm. 



BOWEL:

Constipation without fecal impaction or obstruction.  



APPENDIX:

No abnormalities to suggest acute appendicitis. No right lower 

quadrant inflammatory processes identified. 



PERITONEUM:

Unremarkable. No free fluid. No free air. 



LYMPH NODES:

Unremarkable. No enlarged lymph nodes. 



BLADDER:

Unremarkable. 



REPRODUCTIVE:

Unremarkable. 



BONES:

No acute fracture. 



OTHER FINDINGS:

None.



IMPRESSION:

No significant or acute  findings to account for/ related to the 

clinical presentation.



Additional benign and/or incidental findings described above.



No significant interval change compared to the prior examination(s).

## 2018-05-22 ENCOUNTER — HOSPITAL ENCOUNTER (EMERGENCY)
Dept: HOSPITAL 42 - ED | Age: 43
Discharge: HOME | End: 2018-05-22
Payer: MEDICAID

## 2018-05-22 VITALS — OXYGEN SATURATION: 100 % | TEMPERATURE: 98 F

## 2018-05-22 VITALS — SYSTOLIC BLOOD PRESSURE: 150 MMHG | HEART RATE: 70 BPM | DIASTOLIC BLOOD PRESSURE: 85 MMHG

## 2018-05-22 VITALS — RESPIRATION RATE: 18 BRPM

## 2018-05-22 VITALS — BODY MASS INDEX: 30.9 KG/M2

## 2018-05-22 DIAGNOSIS — M79.601: ICD-10-CM

## 2018-05-22 DIAGNOSIS — E03.9: ICD-10-CM

## 2018-05-22 DIAGNOSIS — I10: ICD-10-CM

## 2018-05-22 DIAGNOSIS — F41.9: Primary | ICD-10-CM

## 2018-05-22 DIAGNOSIS — E11.9: ICD-10-CM

## 2018-05-22 LAB
ALBUMIN SERPL-MCNC: 4.5 G/DL (ref 3–4.8)
ALBUMIN/GLOB SERPL: 1.2 {RATIO} (ref 1.1–1.8)
ALT SERPL-CCNC: 25 U/L (ref 7–56)
APTT BLD: 26 SECONDS (ref 25.1–36.5)
AST SERPL-CCNC: 34 U/L (ref 14–36)
BASOPHILS # BLD AUTO: 0.02 K/MM3 (ref 0–2)
BASOPHILS NFR BLD: 0.3 % (ref 0–3)
BUN SERPL-MCNC: 14 MG/DL (ref 7–21)
CALCIUM SERPL-MCNC: 9.3 MG/DL (ref 8.4–10.5)
EOSINOPHIL # BLD: 0.1 10*3/UL (ref 0–0.7)
EOSINOPHIL NFR BLD: 1.7 % (ref 1.5–5)
ERYTHROCYTE [DISTWIDTH] IN BLOOD BY AUTOMATED COUNT: 13.4 % (ref 11.5–14.5)
GFR NON-AFRICAN AMERICAN: > 60
GRANULOCYTES # BLD: 2.94 10*3/UL (ref 1.4–6.5)
GRANULOCYTES NFR BLD: 45 % (ref 50–68)
HGB BLD-MCNC: 10.9 G/DL (ref 12–16)
INR PPP: 0.97 (ref 0.93–1.08)
LYMPHOCYTES # BLD: 3.1 10*3/UL (ref 1.2–3.4)
LYMPHOCYTES NFR BLD AUTO: 47.5 % (ref 22–35)
MCH RBC QN AUTO: 22.3 PG (ref 25–35)
MCHC RBC AUTO-ENTMCNC: 32.4 G/DL (ref 31–37)
MCV RBC AUTO: 68.9 FL (ref 80–105)
MONOCYTES # BLD AUTO: 0.4 10*3/UL (ref 0.1–0.6)
MONOCYTES NFR BLD: 5.5 % (ref 1–6)
PLATELET # BLD: 352 10^3/UL (ref 120–450)
PMV BLD AUTO: 9.5 FL (ref 7–11)
PROTHROMBIN TIME: 11.1 SECONDS (ref 9.4–12.5)
RBC # BLD AUTO: 4.88 10^6/UL (ref 3.5–6.1)
TROPONIN I SERPL-MCNC: < 0.01 NG/ML
WBC # BLD AUTO: 6.5 10^3/UL (ref 4.5–11)

## 2018-05-22 PROCEDURE — 80353 DRUG SCREENING COCAINE: CPT

## 2018-05-22 PROCEDURE — 85610 PROTHROMBIN TIME: CPT

## 2018-05-22 PROCEDURE — 85730 THROMBOPLASTIN TIME PARTIAL: CPT

## 2018-05-22 PROCEDURE — 80345 DRUG SCREENING BARBITURATES: CPT

## 2018-05-22 PROCEDURE — 83930 ASSAY OF BLOOD OSMOLALITY: CPT

## 2018-05-22 PROCEDURE — 70450 CT HEAD/BRAIN W/O DYE: CPT

## 2018-05-22 PROCEDURE — 82010 KETONE BODYS QUAN: CPT

## 2018-05-22 PROCEDURE — 80361 OPIATES 1 OR MORE: CPT

## 2018-05-22 PROCEDURE — 80346 BENZODIAZEPINES1-12: CPT

## 2018-05-22 PROCEDURE — 80358 DRUG SCREENING METHADONE: CPT

## 2018-05-22 PROCEDURE — 96374 THER/PROPH/DIAG INJ IV PUSH: CPT

## 2018-05-22 PROCEDURE — 80324 DRUG SCREEN AMPHETAMINES 1/2: CPT

## 2018-05-22 PROCEDURE — 99284 EMERGENCY DEPT VISIT MOD MDM: CPT

## 2018-05-22 PROCEDURE — 80053 COMPREHEN METABOLIC PANEL: CPT

## 2018-05-22 PROCEDURE — 73030 X-RAY EXAM OF SHOULDER: CPT

## 2018-05-22 PROCEDURE — 93005 ELECTROCARDIOGRAM TRACING: CPT

## 2018-05-22 PROCEDURE — 71045 X-RAY EXAM CHEST 1 VIEW: CPT

## 2018-05-22 PROCEDURE — 80349 CANNABINOIDS NATURAL: CPT

## 2018-05-22 PROCEDURE — 85025 COMPLETE CBC W/AUTO DIFF WBC: CPT

## 2018-05-22 PROCEDURE — 84484 ASSAY OF TROPONIN QUANT: CPT

## 2018-05-22 PROCEDURE — 83992 ASSAY FOR PHENCYCLIDINE: CPT

## 2018-05-22 NOTE — ED PDOC
Arrival/HPI





- General


Chief Complaint: High Blood Sugar


Time Seen by Provider: 05/22/18 15:49


Historian: Patient





- History of Present Illness


Narrative History of Present Illness (Text): 





05/22/18 1555


pt p/w + sudden onset of right arm cramping/severe pain/lower leg numbness/

tingling, and + sob/dizziness after giving herself insulin injection ~ 45min 

prior to Emergency department arrival; pt states pain is severe, pt states no 

fever/chills/sweats, no cp, + intermittent sob over the last few days, no 

palpitations, no abd pain, no n/v, no fall/trauma/sick contact, no travel


pt denied incontinence


pt states her sugar level has been unbalanced/high over the last few weeks 

[place]ing [place] [facility]


pt denied slurr speech, no ha, no vision changes, no arm/leg weakness


pt is here for further eval


pt's without other complaints


pt denied severe anxiety, pt denied SI/HI, pt denied hallucinations





PCP: Dr Velasquez


pt is right hand dominate


Past medical history: cardiomeagly, thyroid disorder, asthma/diabetes, mood 

disorder








Time/Duration: Prior to Arrival


Symptom Onset: Sudden


Symptom Course: Unchanged


Quality: Tightness


Severity Level: Severe


Activities at Onset: Rest


Context: Home





Past Medical History





- Provider Review


Nursing Documentation Reviewed: Yes





- Travel History


Have you recently traveled outside [place] w/in the past 3 mons?: No





- Past History


Past History: No Previous





- Infectious Disease


Hx of Infectious Diseases: None





- Tetanus Immunization


Tetanus Immunization: Unknown





- Reproductive


Menopause: No


Currently Pregnant: Unknown





- Cardiac


Hx Hypertension: Yes





- Pulmonary


Hx Respiratory Disorders: Yes


Hx Asthma: Yes





- Neurological


Hx Neurological Disorder: No





- HEENT


Hx HEENT Disorder: No





- Renal


Hx Kidney Stones: No





- Endocrine/Metabolic


Hx Diabetes Mellitus Type 1: Yes


Hx Hypothyroidism: Yes





- Hematological/Oncological


Hx Blood Disorders: No





- Integumentary


Hx Dermatological Disorder: No





- Musculoskeletal/Rheumatological


Hx Musculoskeletal Disorders: No





- Gastrointestinal


Hx Gastrointestinal Disorders: No





- Genitourinary/Gynecological


Hx Genitourinary Disorders: No





- Psychiatric


Hx Psychophysiologic Disorder: No


Hx Depression: No


Hx Emotional Abuse: No


Hx Physical Abuse: No


Hx Substance Use: No





- Surgical History


Hx Cholecystectomy: Yes


Hx Hysterectomy: Yes


Hx Tubal Ligation: Yes (??)





- Suicidal Assessment


Feels Threatened In Home Enviroment: No





Family/Social History





- Physician Review


Nursing Documentation Reviewed: Yes


Family/Social History: No Known Family HX


Smoking Status: Never Smoked


Hx Alcohol Use: No


Hx Substance Use: No


Hx Substance Use Treatment: No





Allergies/Home Meds


Allergies/Adverse Reactions: 


Allergies





No Known Allergies Allergy (Verified 05/22/18 15:15)


 


 per patient 








Home Medications: 


 Home Meds











 Medication  Instructions  Recorded  Confirmed


 


Glimepiride 2 mg PO BID 10/26/12 03/26/17


 


Metformin Hydrochloride [Metformin] 500 mg PO DAILY 09/26/13 03/26/17


 


Levothyroxine Sodium 75 mcg PO DAILY 02/23/15 03/26/17


 


Amlodipine Besylate/Benazepril 1 cap PO DAILY 03/26/17 03/26/17





[Amlodipine Besylate and   





Benazepril Hydrochlor]   














Review of Systems





- Review of Systems


Constitutional: Normal


Eyes: Normal


ENT: Normal


Respiratory: SOB, Wheezing.  absent: Cough


Cardiovascular: Normal.  absent: Chest Pain, Palpitations


Gastrointestinal: Normal.  absent: Abdominal Pain, Nausea, Vomiting


Genitourinary Female: Normal


Musculoskeletal: Normal


Skin: Normal


Neurological: Dizziness, Other (arm tightness/cramps, b/l leg numbness/tingling)


Endocrine: Normal


Hemo/Lymphatic: Normal


Psychiatric: Normal





Physical Exam





- Physical Exam


Narrative Physical Exam (Text): 





05/22/18 16:26


General: alert/awake, GCS = 15, oriented x 3, resting in bed, uncomfortable, 

cooperative, interactive; moderate distress due to right arm/shoulder pain


Head: NC/AT


EYE: PERRLA, EOMI, sclera anicteric, no nystagmus, no photophobia; visual field 

intact b/l


Facial: WNL


Oral: uvula/tongue are midline, no exudate/lesions, no drooling/stridor, no 

dysphonia; intact dentitions; moist oral mucosa


NECK: intact ROM, no midline tenderness, no nuchal rigidity, no meningeal signs

; no step off


Chest: CTA b/l, no w/r/r; no tachypenia, no accessory muscle use noted


Cardiac: +S1, +S2, no m/r/r, no tachycardia


Abdominal: +BS, soft/nd/nt, well nourished/obese patient; no masses/rebound/

guarding/rigidity; no sutton's sign, no mcburney's point tenderness


Extremities: intact ROM to all limbs except right shoulder pt is holding her 

right arm in a flexed/internally rotated position, strength 5/5 grossly intact 

in all limbs, neurovasc intact b/l; + ambulatory; reflex +2/2; no gross 

deformities noted


BACK: no step off, no midline tenderness, NO crepitus, no gross deformities 

noted; Intact ROM


SKIN: cap refill < 1 sec, no ulcerations, no petechiae, no rashes


NEURO: CNII-XII WNL, no facial asymmetries, no slurr speech, oriented x 3


NIH stroke scale ~ 0


Psych: normal insight, normal affect; follows command with ease








Vital Signs Reviewed: Yes


Vital Signs











  Temp Pulse Resp BP Pulse Ox


 


 05/22/18 15:19  98.0 F  70  18  165/90 H  98











Temperature: Afebrile


Blood Pressure: Hypertensive


Pulse: Regular


Respiratory Rate: Normal


Appearance: Positive for: Well-Appearing, Non-Toxic, Uncomfortable, Other (alert

/awake, GCS = 15, oriented x 3, cooperative, follows command with ease, 

moderate distress due to arm pain, resting in bed)


Pain Distress: Moderate


Mental Status: Positive for: Alert and Oriented X 3





- Systems Exam


Head: Present: Atraumatic, Normocephalic





Medical Decision Making


ED Course and Treatment: 





05/22/18 16:00


Impression: right arm pain, numbness/tingling


i have consider all the differential diagnosis regarding pt's chief medical 

complaints/clinical findings, including but are not limited to: right arm pain, 

numbness/tingling, acute onset - symptoms inconsistent with CVA, likely carpal 

pedal symptom; r/o diabetic caused disorder





A/P: right arm pain, numbness/tingling


- labs


- iv


- xray


- ct


- ua


- supportive care


- observe/reevaluation





1610


pt was re-evaluated and pt is now feeling normal, NO more right arm pain/cramps/

tightness, pt denied numbness/tingling currently; pt is feeling improved





NIH stroke scale ~ 0





1750


pt remained at baseline


pt's daughter is at bedside


pt is awaiting Ct/xray results





1900


pt remained comfortable with mild right forearm pain complaints


pt remained able to move all limbs with ease





NIH stroke scale ~ 0





pt/family are made aware of her medical results


pt is encouraged fluids


pt is encouraged keeping a GLUC diary


pt will f/u as directed


pt will be discharged home





Re-evaluation Time: 16:15


Reassessment Condition: Improved





- Lab Interpretations


Lab Results: 








 05/22/18 17:09 





 05/22/18 17:09 





 Lab Results





05/22/18 17:29: Urine Opiates Screen Negative, Urine Methadone Screen Negative, 

Ur Barbiturates Screen Negative, Ur Phencyclidine Scrn Negative, Ur 

Amphetamines Screen Negative, U Benzodiazepines Scrn Negative, U Oth Cocaine 

Metabols Negative, U Cannabinoids Screen Negative


05/22/18 17:09: Serum Osmolality 292


05/22/18 17:09: Sodium 142, Potassium 4.2, Chloride 100, Carbon Dioxide 29, 

Anion Gap 18, BUN 14, Creatinine 0.5 L, Est GFR ( Amer) > 60, Est GFR (

Non-Af Amer) > 60, Random Glucose 154 H, Calcium 9.3, Total Bilirubin 0.1 L, 

AST 34, ALT 25, Alkaline Phosphatase 53, Troponin I < 0.01, Total Protein 8.0, 

Albumin 4.5, Globulin 3.6, Albumin/Globulin Ratio 1.2


05/22/18 17:09: PT 11.1, INR 0.97, APTT 26.0


05/22/18 17:09: WBC 6.5, RBC 4.88, Hgb 10.9 L, Hct 33.6 L, MCV 68.9 L, MCH 22.3 

L, MCHC 32.4, RDW 13.4, Plt Count 352, MPV 9.5, Gran % 45.0 L, Lymph % (Auto) 

47.5 H, Mono % (Auto) 5.5, Eos % (Auto) 1.7, Baso % (Auto) 0.3, Gran # 2.94, 

Lymph # (Auto) 3.1, Mono # (Auto) 0.4, Eos # (Auto) 0.1, Baso # (Auto) 0.02








I have reviewed the lab results: Yes


Interpretation: Abnormal lab values (mildly elevated GLUC)





- RAD Interpretation


Narrative RAD Interpretations (Text): 





05/22/18 18:58











PROCEDURE:  CT HEAD WITHOUT CONTRAST.


Dictator : Bro Bugrer MD


Report Date : 05/22/2018 18:54:32


IMPRESSION:


No acute intracranial abnormalities. No significant findings to account for the 

clinical presentation. No significant interval change compared to the prior 

examination(s).





--------------------------------------------------------------------------------

---------------


05/22/18 19:27: 





-- Chest X-ray read and interpreted by me shows no acute disease. Slightly 

rotated. 





-- Right Shoulder X-Ray read and interpreted by me shows no dislocation. No 

fracture. 











Radiology Orders: 








05/22/18 15:49


HEAD W/O CONTRAST [CT] Stat 





05/22/18 15:50


CHEST PORTABLE [RAD] Stat 





05/22/18 15:52


SHOULDER RIGHT [RAD] Stat 











: Radiologist





- EKG Interpretation


EKG Interpretation (Text): 





05/22/18 16:31


NSR at 70 bpm, normal axis, no ectopy, biphasic T in leads III, V2, no st 

changes, BORDERLINE EKG; no gross changes compare with old ekg 2/2015











Interpreted by ED Physician: Yes


Type: 12 lead EKG


Comparison: Similar to previous EKG





- Medication Orders


Current Medication Orders: 











Discontinued Medications





Diazepam (Valium)  5 mg PO ONCE ONE


   PRN Reason: Protocol


   Stop: 05/22/18 15:55


   Last Admin: 05/22/18 18:50  Dose: 5 mg





Sodium Chloride (Sodium Chloride 0.9%)  500 mls @ 999 mls/hr IV .Q31M STA


   Stop: 05/22/18 16:23


   Last Admin: 05/22/18 18:50  Dose: 999 mls/hr





eMAR Start Stop


 Document     05/22/18 18:50  LA  (Rec: 05/22/18 19:01  LA  [id number])


     Intravenous Solution


      Start Date                                 05/22/18


      Start Time                                 18:50


      End Date                                   05/22/18


      End time                                   19:20


      Total Infusion Time                        30





Ibuprofen (Motrin Tab)  600 mg PO STAT STA


   Stop: 05/22/18 19:20


   Last Admin: 05/22/18 19:25  Dose: 600 mg





MAR Pain/Vitals


 Document     05/22/18 19:25  LA  (Rec: 05/22/18 19:27  RENE BOSTON-PC)


     Pain Reassessment


      Is This A Pain ReAssessment?               No


     Sleep


      Is patient sleeping during reassessment?   No


     Presence of Pain


      Presence of Pain                           Yes


     Pain Scale Used


      Pain Scale Used                            Numeric


     Location


      Left, Right or Bilateral                   Right


      Pain Location Body Site                    Arm


      Description                                Intermittent


      Intensity                                  4


      Scale Used                                 Numeric


      Pain Behavior                              Guarding





Ketorolac Tromethamine (Toradol)  30 mg IVP STAT STA


   Stop: 05/22/18 15:54


   Last Admin: 05/22/18 18:50  Dose: 30 mg





MAR Pain Assessment


 Document     05/22/18 18:50  LA  (Rec: 05/22/18 19:00  RENE [id number])


     Pain Reassessment


      Is this a pain reassessment?               No


     Sleep


      Is patient sleeping during reassessment?   No


     Presence of Pain


      Presence of Pain                           Yes


     Pain Scale Used


      Pain Scale Used                            Numeric


     Location


      Left, Right or Bilateral                   Right


      Pain Location Body Site                    Arm


     Description


      Description                                Intermittent


      Acceptable Level of Pain                   7


IVP Administration


 Document     05/22/18 18:50  LA  (Rec: 05/22/18 19:00  RENE [id number])


     Charges for Administration


      # of IVP Administrations                   1














Disposition/Present on Arrival





- Present on Arrival


Any Indicators Present on Arrival: No


History of DVT/PE: No


History of Uncontrolled Diabetes: No


Urinary Catheter: No


History of Decub. Ulcer: No


History Surgical Site Infection Following: None





- Disposition


Have Diagnosis and Disposition been Completed?: Yes


Diagnosis: 


 Arm pain, General medical exam, Anxiety





Disposition: HOME/ ROUTINE


Disposition Time: 19:20


Patient Plan: Discharge


Patient Problems: 


 Current Active Problems











Problem Status Onset


 


Arm pain Acute  


 


General medical exam Acute  











Condition: STABLE


Discharge Instructions (ExitCare):  Anxiety, Adult (DC), Shoulder Pain (DC)


Print Language: ENGLISH


Additional Instructions: 


Make sure to see your doctor in 1-2 days


DRINK PLENTY OF FLUIDS


take your medications as prescribed


RETURN TO ED IF worse pain, cant breath, persistent vomiting, high fever >101-

102 for hours, altered behavior, slurr speech, facial changes, focal weakness (

arm/leg or both), unable to urinate, heavy/persistent bleeding, severe anxiety/

depression, passing out, chest pain, or other medical emergencies


Prescriptions: 


diaZEpam [Valium] 5 mg PO TID PRN #10 tab


 PRN Reason: Muscle Spasm


Ibuprofen [Motrin] 600 mg PO TID PRN #30 tab


 PRN Reason: Pain, Mild (1-3)


Referrals: 


Kaitlynn Casillas MD [Primary Care Provider] - Follow up with primary


[facility] [Outside] - Follow up with primary


[facility] [Outside] - Follow up with primary


[facility] [place] Follow up with primary


Forms:  [employer] [facility] (English)

## 2018-05-22 NOTE — CT
PROCEDURE:  CT HEAD WITHOUT CONTRAST.



HISTORY:

right arm weakness/pain?



COMPARISON:

05/15/2014 



TECHNIQUE:

Axial computed tomography images were obtained through the head/brain 

without intravenous contrast.  



Radiation dose:



Total exam DLP = 830.36 mGy-cm.



This CT exam was performed using one or more of the following dose 

reduction techniques: Automated exposure control, adjustment of the 

mA and/or kV according to patient size, and/or use of iterative 

reconstruction technique.



FINDINGS:



HEMORRHAGE:

No intracranial hemorrhage. 



BRAIN:

No mass effect or edema.  No atrophy or chronic microvascular 

ischemic changes.



VENTRICLES:

Unremarkable. No hydrocephalus. 



CALVARIUM:

Unremarkable.



PARANASAL SINUSES:

Unremarkable as visualized. No significant inflammatory changes.



MASTOID AIR CELLS:

Unremarkable as visualized. No inflammatory changes.



OTHER FINDINGS:

None.



IMPRESSION:

No acute intracranial abnormalities. No significant findings to 

account for the clinical presentation. No significant interval change 

compared to the prior examination(s).

## 2018-05-23 NOTE — RAD
PROCEDURE:  Radiographs of the Right Shoulder



HISTORY:

right shoulder/pain, no trauma







COMPARISON:

No prior.



FINDINGS:



BONES:

Normal. No fracture.



JOINTS:

Normal. Glenohumeral and acromioclavicular joints preserved. No 

osteoarthritis.



SOFT TISSUES:

Normal.



OTHER FINDINGS:

None.



IMPRESSION:

Normal radiographs of the right shoulder.



___________________________________________________________



Concordant results with the preliminary interpretation rendered by 

the emergency department physician

procedure.

## 2018-05-23 NOTE — CARD
--------------- APPROVED REPORT --------------





EKG Measurement

Heart [id number]

MA [id number]

[id number]

[id number]

[id number]



<Conclusion>

Normal sinus rhythm

Possible Left atrial enlargement

## 2018-05-23 NOTE — RAD
HISTORY:

weakness/right shoulder pain  



COMPARISON:

No prior. 



FINDINGS:



LUNGS:

No active pulmonary disease.



PLEURA:

No significant pleural effusion identified, no pneumothorax apparent.



CARDIOVASCULAR:

Normal.



OSSEOUS STRUCTURES:

No significant abnormalities.



VISUALIZED UPPER ABDOMEN:

Normal.



OTHER FINDINGS:

None.



IMPRESSION:

No active disease. 



___________________________________________________________



Concordant results with the preliminary interpretation rendered by 

the emergency department physician

procedure.

## 2018-08-27 ENCOUNTER — HOSPITAL ENCOUNTER (EMERGENCY)
Dept: HOSPITAL 42 - ED | Age: 43
Discharge: HOME | End: 2018-08-27
Payer: MEDICAID

## 2018-08-27 VITALS
TEMPERATURE: 97.9 F | RESPIRATION RATE: 18 BRPM | OXYGEN SATURATION: 99 % | SYSTOLIC BLOOD PRESSURE: 123 MMHG | HEART RATE: 62 BPM | DIASTOLIC BLOOD PRESSURE: 79 MMHG

## 2018-08-27 VITALS — BODY MASS INDEX: 30.9 KG/M2
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
## 2018-08-27 DIAGNOSIS — E78.5: ICD-10-CM

## 2018-08-27 DIAGNOSIS — S92.352A: Primary | ICD-10-CM

## 2018-08-27 DIAGNOSIS — E11.9: ICD-10-CM

## 2018-08-27 DIAGNOSIS — E03.9: ICD-10-CM

## 2018-08-27 DIAGNOSIS — I10: ICD-10-CM

## 2018-08-27 NOTE — RAD
Date of service: 



08/27/2018



PROCEDURE:  Left Foot Radiographs.



HISTORY:

injury, fall  



COMPARISON:

None.



FINDINGS:



BONES:

There is a displaced fracture of the base of the left 5th metatarsal 



JOINTS:

Normal. 



SOFT TISSUES:

Normal. 



OTHER FINDINGS:

None.



IMPRESSION:

There is a displaced fracture of the base of the left 5th metatarsal

## 2018-08-27 NOTE — ED PDOC
Arrival/HPI





- General


Chief Complaint: Lower Extremity Problem/Injury


Time Seen by Provider: 08/27/18 12:32


Historian: Patient





- History of Present Illness


Narrative History of Present Illness (Text): 





08/27/18 12:34





A 42 year old female, whose past medical history includes HTN, DM, HLD, and 

asthma, presents to the emergency department with a complaint of left foot pain 

s/p mechanical fall. Patient states that she fell on the floor from a bus and 

sustained an injury to her foot. She reports left foot pain, but denies any 

other injuries. The patient denies fevers, chills, headache, dizziness, sore 

throat, cough, chest pain, shortness of breath, dyspnea on exertion, abdominal 

pain, nausea, vomiting, diarrhea, neck/back pain, urinary/bowel changes or any 

other complaint. 








PMD: Dr. Anderson











Time/Duration: Prior to Arrival


Symptom Onset: Sudden


Symptom Course: Unchanged


Activities at Onset: Rest, Light


Context: Street





Past Medical History





- Provider Review


Nursing Documentation Reviewed: Yes





- Past History


Past History: No Previous





- Infectious Disease


Hx of Infectious Diseases: None





- Tetanus Immunization


Tetanus Immunization: Unknown





- Reproductive


Menopause: No





- Cardiac


Hx Hypertension: Yes





- Pulmonary


Hx Respiratory Disorders: Yes


Hx Asthma: Yes





- Neurological


Hx Neurological Disorder: No





- HEENT


Hx HEENT Disorder: No





- Renal


Hx Kidney Stones: No





- Endocrine/Metabolic


Hx Diabetes Mellitus Type 1: Yes


Hx Hypothyroidism: Yes





- Hematological/Oncological


Hx Blood Disorders: No





- Integumentary


Hx Dermatological Disorder: No





- Musculoskeletal/Rheumatological


Hx Musculoskeletal Disorders: No





- Gastrointestinal


Hx Gastrointestinal Disorders: No





- Genitourinary/Gynecological


Hx Genitourinary Disorders: No





- Psychiatric


Hx Psychophysiologic Disorder: No


Hx Depression: No


Hx Emotional Abuse: No


Hx Physical Abuse: No


Hx Substance Use: No





- Surgical History


Hx Cholecystectomy: Yes


Hx Hysterectomy: Yes


Hx Tubal Ligation: Yes (??)





- Anesthesia


Hx Anesthesia: No





- Suicidal Assessment


Feels Threatened In Home Enviroment: No





Family/Social History





- Physician Review


Nursing Documentation Reviewed: Yes


Family/Social History: No Known Family HX


Smoking Status: Never Smoked


Hx Alcohol Use: No


Hx Substance Use: No


Hx Substance Use Treatment: No





Allergies/Home Meds


Allergies/Adverse Reactions: 


Allergies





No Known Allergies Allergy (Verified 05/22/18 15:15)


 


 per patient 








Home Medications: 


 Home Meds











 Medication  Instructions  Recorded  Confirmed


 


Carvedilol [Coreg] 6.25 mg PO BID 05/22/18 05/22/18


 


Gabapentin [Neurontin] 600 mg PO HS 05/22/18 05/22/18


 


Lamotrigine [Lamictal Xr] 25 mg PO BID 05/22/18 05/22/18


 


Levothyroxine [Synthroid] 75 mcg PO DAILY 05/22/18 05/22/18


 


MetFORMIN [glucoPHAGE] 1,000 mg PO BID 05/22/18 05/22/18


 


Prenatal Multivit/Folic Acid/I 1 tab PO DAILY 05/22/18 05/22/18





[Prenatal]   














Review of Systems





- Physician Review


All systems were reviewed & negative as marked: Yes





- Review of Systems


Constitutional: absent: Fevers


Respiratory: absent: SOB, Cough


Cardiovascular: absent: Chest Pain, TAYLOR


Gastrointestinal: absent: Abdominal Pain, Stool Changes, Diarrhea, Nausea, 

Vomiting


Genitourinary Female: absent: Urine Output Changes


Musculoskeletal: Other (Left Foot Pain).  absent: Back Pain, Neck Pain


Neurological: absent: Headache, Dizziness





Physical Exam


Vital Signs Reviewed: Yes


Vital Signs











  Temp Pulse Resp BP Pulse Ox


 


 08/27/18 12:03  97.7 F  83  20  115/89  100


 


 08/27/18 11:58  97.7 F  83  20  115/89  100











Temperature: Afebrile


Blood Pressure: Normal


Pulse: Regular


Respiratory Rate: Normal


Appearance: Positive for: Well-Appearing, Non-Toxic, Comfortable


Pain Distress: None


Mental Status: Positive for: Alert and Oriented X 3





- Systems Exam


Head: Present: Atraumatic, Normocephalic


Pupils: Present: PERRL


Extroacular Muscles: Present: EOMI


Conjunctiva: Present: Normal


Mouth: Present: Moist Mucous Membranes


Neck: Present: Normal Range of Motion


Respiratory/Chest: Present: Clear to Auscultation, Good Air Exchange.  No: 

Respiratory Distress, Accessory Muscle Use


Cardiovascular: Present: Regular Rate and Rhythm, Normal S1, S2.  No: Murmurs


Abdomen: No: Tenderness, Distention, Peritoneal Signs


Back: Present: Normal Inspection


Upper Extremity: Present: Normal Inspection.  No: Cyanosis, Edema


Lower Extremity: Present: Tenderness (Tenderness over the dorsum of the left 

foot. No tenderness of the lower leg and the medial and lateral malleoli.), 

Swelling (Swelling over the dorsum of the left foot. ).  No: Edema


Neurological: Present: GCS=15, CN II-XII Intact, Speech Normal


Skin: Present: Warm, Dry, Normal Color.  No: Rashes


Psychiatric: Present: Alert, Oriented x 3, Normal Insight, Normal Concentration





Medical Decision Making


ED Course and Treatment: 





08/27/18 12:43





Impression:


A 42 year old female presents to the emergency department with a complaint of 

left foot pain s/p fall.





Plan:


-- Left Ankle X- Ray


-- Left Foot X- Ray


-- Tylenol


-- Reassess and disposition





Prior Visits:


Notes and results from previous visits were reviewed.





Progress Notes:








08/27/18 13:39


On reevaluation the patient feels better and is in no acute distress. I have 

discussed the results and plan with the patient, who expresses understanding. 

Patient given the opportunity to ask question, all questions were answered and 

there is agreement with the plan to discharge the patient home.  Patient is 

stable for discharge. Patient was instructed to follow up with an Orthopedist/

Podiatrist in 1-2 days or return if symptoms persist/worsen or new concerning 

symptoms arise. All discussion with patient through Bulgarian  with 

patient's permission. Patient states she has full understand and is able to 

make the appripriate follow up.





08/27/18 14:01


a short leg posterior splint was applied by Ed tech, splint checked for 

integrity by myself and neurovascular intact pre and post procedure.


08/27/18 14:02








- RAD Interpretation


Narrative RAD Interpretations (Text): 








PROCEDURE:  Left Foot Radiographs.


Dictator : Darrick Laboy MD


Report Date : 08/27/2018 13:25:13


IMPRESSION: Displaced fracture of the base of the 5th metatarsal








PROCEDURE:  Left Foot Radiographs.


Dictator : Darrick Laboy MD


Report Date : 08/27/2018 13:23:30


IMPRESSION: There is a displaced fracture of the base of the left 5th metatarsal





Radiology Orders: 








08/27/18 12:33


FOOT LEFT 5TH DIGIT (TOE) [RAD] Stat 





08/27/18 12:34


ANKLE LEFT 3 VIEWS ROUTINE [RAD] Stat 














- Medication Orders


Current Medication Orders: 











Discontinued Medications





Acetaminophen (Tylenol 325mg Tab)  975 mg PO STAT STA


   Stop: 08/27/18 12:34


   Last Admin: 08/27/18 13:17  Dose: 975 mg





MAR Pain/Vitals


 Document     08/27/18 13:17  CMA  (Rec: 08/27/18 13:18  [facility]  [id number])


     Pain Reassessment


      Is This A Pain ReAssessment?               Yes














- Scribe Statement


The provider has reviewed the documentation as recorded by the Scribe





Arpita Amaro





Provider Scribe Attestation:


All medical record entries made by the Scribe were at my direction and 

personally dictated by me. I have reviewed the chart and agree that the record 

accurately reflects my personal performance of the history, physical exam, 

medical decision making, and the department course for this patient. I have 

also personally directed, reviewed, and agree with the discharge instructions 

and disposition.








Disposition/Present on Arrival





- Present on Arrival


Any Indicators Present on Arrival: No


History of DVT/PE: No


History of Uncontrolled Diabetes: No


Urinary Catheter: No


History of Decub. Ulcer: No


History Surgical Site Infection Following: None





- Disposition


Have Diagnosis and Disposition been Completed?: Yes


Diagnosis: 


 Fracture of fifth metatarsal bone of left foot





Disposition: HOME/ ROUTINE


Disposition Time: 14:03


Patient Plan: Discharge


Patient Problems: 


 Current Active Problems











Problem Status Onset


 


Fracture of fifth metatarsal bone of left foot Acute  











Condition: STABLE


Discharge Instructions (ExitCare):  How to Use Crutches, Foot Fracture (DC)


Print Language: ENGLISH


Additional Instructions: 


Follow up with the orthopedic or foot specialist within one week.








SHEFALI PUGA, thank you for letting us take care of you today. Your provider 

was Dr. Edenilson Garcia and you were treated for fracture of the proximal fifth 

metatarsal.. The emergency medical care you received today was directed at your 

acute symptoms. If you were prescribed any medication, please fill it and take 

as directed. It may take several days for your symptoms to resolve. Return to 

the Emergency Department if your symptoms worsen, do not improve, or if you 

have any other problems.





Please contact your doctor or call one of the physicians/clinics you have been 

referred to that are listed on the Patient Visit Information form that is 

included in your discharge packet. Bring any paperwork you were given at 

discharge with you along with any medications you are taking to your follow up 

visit. Our treatment cannot replace ongoing medical care by a primary care 

provider outside of the emergency department.





Thank you for allowing the [facility] team to be part of your care today.








If you had an X-Ray or CT scan: A Radiologist will review the ED reading if any 

change in treatment is needed we will contact you.***





If you had a blood, urine, or wound culture: It will take several days for the 

results, if any change in treatment is needed we will contact you.***





If you had an STI test: It will take 48 hours for the results. Please call 

after 1 week if you have not heard back.***


Prescriptions: 


Ibuprofen [Motrin Tab] 800 mg PO TID #42 tab


Referrals: 


 Service [Outside] - Follow up with primary


Kristan De Los Santos MD [Staff Provider] - Follow up with primary


Jitendra Mann DPM [Staff Provider] - Follow up with primary


Forms:  [employer] (English), WORK NOTE

## 2018-08-27 NOTE — RAD
Date of service: 



08/27/2018



PROCEDURE:  Left Foot Radiographs.



HISTORY:

injury, fall  



COMPARISON:

None.



FINDINGS:



BONES:

There is a displaced fracture of the base of the 5th metatarsal. 



JOINTS:

Normal. 



SOFT TISSUES:

Normal. 



OTHER FINDINGS:

None.



IMPRESSION:

Displaced fracture of the base of the 5th metatarsal